# Patient Record
Sex: MALE | Race: BLACK OR AFRICAN AMERICAN | RURAL
[De-identification: names, ages, dates, MRNs, and addresses within clinical notes are randomized per-mention and may not be internally consistent; named-entity substitution may affect disease eponyms.]

---

## 2018-05-17 ENCOUNTER — OFFICE VISIT (OUTPATIENT)
Dept: FAMILY MEDICINE CLINIC | Age: 28
End: 2018-05-17

## 2018-05-17 VITALS
OXYGEN SATURATION: 97 % | HEART RATE: 77 BPM | TEMPERATURE: 98.6 F | DIASTOLIC BLOOD PRESSURE: 65 MMHG | WEIGHT: 157.8 LBS | HEIGHT: 65 IN | RESPIRATION RATE: 20 BRPM | BODY MASS INDEX: 26.29 KG/M2 | SYSTOLIC BLOOD PRESSURE: 120 MMHG

## 2018-05-17 DIAGNOSIS — Q90.9 DOWN'S SYNDROME: ICD-10-CM

## 2018-05-17 DIAGNOSIS — Z00.00 HEALTHCARE MAINTENANCE: Primary | ICD-10-CM

## 2018-05-17 DIAGNOSIS — Z00.00 PREVENTATIVE HEALTH CARE: ICD-10-CM

## 2018-05-17 DIAGNOSIS — Z76.89 ENCOUNTER TO ESTABLISH CARE: ICD-10-CM

## 2018-05-17 NOTE — MR AVS SNAPSHOT
[address] [address] 
[phone] Patient: Zen Montana MRN: [id number] QSX: Visit Information Date & Time Provider Department Dept. Phone Encounter #  
 2018  2:40 PM Fabio Hemphill  [facility] [phone] [id number] Follow-up Instructions Return if symptoms worsen or fail to improve. Upcoming Health Maintenance Date Due DTaP/Tdap/Td series (1 - Tdap) 2009 Influenza Age 5 to Adult 2018 Allergies as of 2018  Review Complete On: 2018 By: Fabio Hemphill NP No Known Allergies Current Immunizations  Never Reviewed No immunizations on file. Not reviewed this visit You Were Diagnosed With   
  
 Codes Comments Healthcare maintenance    -  Primary ICD-10-CM: Z00.00 ICD-9-CM: V70.0 Encounter to establish care     ICD-10-CM: Z76.89 
ICD-9-CM: V65.8 Down's syndrome     ICD-10-CM: Q90.9 ICD-9-CM: 824. 0 Vitals BP Pulse Temp Resp Height(growth percentile) Weight(growth percentile) 120/65 77 98.6 °F (37 °C) (Oral) 20 5' 4.57\" (1.64 m) 157 lb 12.8 oz (71.6 kg) SpO2 BMI Smoking Status 97% 26.61 kg/m2 Never Smoker Vitals History BMI and BSA Data Body Mass Index Body Surface Area  
 26.61 kg/m 2 1.81 m 2 Your Updated Medication List  
  
   
This list is accurate as of 18  3:30 PM.  Always use your most recent med list.  
  
  
  
  
 diph,Pertuss(Acell),Tet Vac-PF 2 Lf-(2.5-5-3-5 mcg)-5Lf/0.5 mL susp Commonly known as:  ADACEL  
0.5 mL by IntraMUSCular route once for 1 dose. Indications: DIPHTHERIA-PERTUSSIS-TETANUS COMBINED PREVENTION Prescriptions Printed Refills diph,Pertuss,Acell,,Tet Vac-PF (ADACEL) 2 Lf-(2.5-5-3-5 mcg)-5Lf/0.5 mL susp 0 Si.5 mL by IntraMUSCular route once for 1 dose.  Indications: DIPHTHERIA-PERTUSSIS-TETANUS COMBINED PREVENTION Class: Print Route: IntraMUSCular We Performed the Following CBC W/O DIFF [70797 CPT(R)] LIPID PANEL [07100 CPT(R)] METABOLIC PANEL, COMPREHENSIVE [49717 CPT(R)] [address] Comments:  
 PT/OT eval for home modifications. Please refer to [facility]. Follow-up Instructions Return if symptoms worsen or fail to improve. Referral Information Referral ID Referred By Referred To  
  
 [id number] Jun Reid Not Available Visits Status Start Date End Date 1 New Request 5/17/18 5/17/19 If your referral has a status of pending review or denied, additional information will be sent to support the outcome of this decision. Patient Instructions Learning About Sleeping Well What does sleeping well mean? Sleeping well means getting enough sleep. How much sleep is enough varies among people. The number of hours you sleep is not as important as how you feel when you wake up. If you do not feel refreshed, you probably need more sleep. Another sign of not getting enough sleep is feeling tired during the day. The average total nightly sleep time is 7½ to 8 hours. Healthy adults may need a little more or a little less than this. Why is getting enough sleep important? Getting enough quality sleep is a basic part of good health. When your sleep suffers, your mood and your thoughts can suffer too. You may find yourself feeling more grumpy or stressed. Not getting enough sleep also can lead to serious problems, including injury, accidents, anxiety, and depression. What might cause poor sleeping? Many things can cause sleep problems, including: · Stress. Stress can be caused by fear about a single event, such as giving a speech. Or you may have ongoing stress, such as worry about work or school. · Depression, anxiety, and other mental or emotional conditions. · Changes in your sleep habits or surroundings. This includes changes that happen where you sleep, such as noise, light, or sleeping in a different bed. It also includes changes in your sleep pattern, such as having jet lag or working a late shift. · Health problems, such as pain, breathing problems, and restless legs syndrome. · Lack of regular exercise. How can you help yourself? Here are some tips that may help you sleep more soundly and wake up feeling more refreshed. Your sleeping area · Use your bedroom only for sleeping and sex. A bit of light reading may help you fall asleep. But if it doesn't, do your reading elsewhere in the house. Don't watch TV in bed. · Be sure your bed is big enough to stretch out comfortably, especially if you have a sleep partner. · Keep your bedroom quiet, dark, and cool. Use curtains, blinds, or a sleep mask to block out light. To block out noise, use earplugs, soothing music, or a \"white noise\" machine. Your evening and bedtime routine · Create a relaxing bedtime routine. You might want to take a warm shower or bath, listen to soothing music, or drink a cup of noncaffeinated tea. · Go to bed at the same time every night. And get up at the same time every morning, even if you feel tired. What to avoid · Limit caffeine (coffee, tea, caffeinated sodas) during the day, and don't have any for at least 4 to 6 hours before bedtime. · Don't drink alcohol before bedtime. Alcohol can cause you to wake up more often during the night. · Don't smoke or use tobacco, especially in the evening. Nicotine can keep you awake. · Don't take naps during the day, especially close to bedtime. · Don't lie in bed awake for too long. If you can't fall asleep, or if you wake up in the middle of the night and can't get back to sleep within 15 minutes or so, get out of bed and go to another room until you feel sleepy.  
· Don't take medicine right before bed that may keep you awake or make you feel hyper or energized. Your doctor can tell you if your medicine may do this and if you can take it earlier in the day. If you can't sleep · Imagine yourself in a peaceful, pleasant scene. Focus on the details and feelings of being in a place that is relaxing. · Get up and do a quiet or boring activity until you feel sleepy. · Don't drink any liquids after 6 p.m. if you wake up often because you have to go to the bathroom. Where can you learn more? Go to http://jefferson-kizzy.info/. Enter C653 in the search box to learn more about \"Learning About Sleeping Well. \" Current as of: May 12, 2017 Content Version: 11.4 © 4391-1147 Healthwise, [employer]. Care instructions adapted under license by [employer] (which disclaims liability or warranty for this information). If you have questions about a medical condition or this instruction, always ask your healthcare professional. [place] [address] any warranty or liability for your use of this information. Introducing [facility]! Avni Peters introduces [employer] patient portal. Now you can access parts of your medical record, email your doctor's office, and request medication refills online. 1. In your internet browser, go to [email or web address] 2. Click on the First Time User? Click Here link in the Sign In box. You will see the New Member Sign Up page. 3. Enter your [employer] Access Code exactly as it appears below. You will not need to use this code after youve completed the sign-up process. If you do not sign up before the expiration date, you must request a new code. · [employer] Access Code: [email or web address] Expires: 8/15/2018  2:41 PM 
 
4. Enter the last four digits of your Social Security Number (xxxx) and Date of Birth (mm/dd/yyyy) as indicated and click Submit. You will be taken to the next sign-up page. 5. Create a [employer] ID. This will be your [employer] login ID and cannot be changed, so think of one that is secure and easy to remember. 6. Create a [employer] password. You can change your password at any time. 7. Enter your Password Reset Question and Answer. This can be used at a later time if you forget your password. 8. Enter your e-mail address. You will receive e-mail notification when new information is available in [address] 9. Click Sign Up. You can now view and download portions of your medical record. 10. Click the Download Summary menu link to download a portable copy of your medical information. If you have questions, please visit the Frequently Asked Questions section of the [employer] website. Remember, [employer] Network is NOT to be used for urgent needs. For medical emergencies, dial 911. Now available from your iPhone and Android! Please provide this summary of care documentation to your next provider. If you have any questions after today's visit, please call [phone].

## 2018-05-17 NOTE — PROGRESS NOTES
1. Have you been to the ER, urgent care clinic since your last visit? Hospitalized since your last visit? No    2. Have you seen or consulted any other health care providers outside of the [facility] since your last visit? Include any pap smears or colon screening.  No  Reviewed record in preparation for visit and have necessary documentation  Pt did not bring medication to office visit for review    Goals that were addressed and/or need to be completed during or after this appointment include     Health Maintenance Due   Topic Date Due    DTaP/Tdap/Td series (1 - Tdap) 12/25/2009

## 2018-05-17 NOTE — PROGRESS NOTES
Alexandra Bonilla  32 y.o. male  1990  [address] [facility]  <[id number]>     [facility]: Progress Note       Encounter Date: 5/17/2018    Chief Complaint   Patient presents with    New Patient     History of Present Illness   Alexandra Bonilla is a 32 y.o. male who presents to clinic today with his mother and Cholo Lopez RN Case Manager ([phone] cell) for: Establishing care and insurance recommendations. Per RN Case Manager, Naomi Gordon qualifies for home modifications and equipment. Per mom, Naomi Gordon has fears about falling and refuses to get in the bathtub/shower. RN Case Manager request a referral for HH-PT/OT evaluation and recommendations for DME and any other home safety recommendations. RN Case Manager states that Naomi Gordon also qualifies for dental treatment beyond basic preventative care. A prior authorization is required and documentation from the dentist and PCP is needed. Naomi Gordon has several dental caries. At this time his oral health does not hinder him from eating however due to his intake of sugary drinks he is at risk for pulpitis, gingivitis, possible abscesses and cardiac disease. Currently Naomi Gordon has no CC. He eats a balanced diet however he does add salt to his foods. Discussed decreasing sugary drinks (Mt. Dew) and also not drinking caffenine after 1800. Patient is awake sometimes to 0400 and admits to drinking soda all day and night. Naomi Gordon denies chest pain, SOB, dizziness/fainting, decreased appetite, hot/cold intolerance, issues with voiding/stooling. Health Maintenance  Prescription printed for the Tdap and patient will take it to his local pharmacy for administration. Health Maintenance Due   Topic Date Due    DTaP/Tdap/Td series (1 - Tdap) 12/25/2009     Review of Systems   Review of Systems   Constitutional: Negative. HENT: Negative. Eyes: Negative. Respiratory: Positive for cough. Cardiovascular: Negative. Gastrointestinal: Negative. Genitourinary: Negative. Musculoskeletal: Negative. Skin: Negative. Neurological: Negative. Endo/Heme/Allergies: Positive for environmental allergies. Psychiatric/Behavioral: Negative. Vitals/Objective:     Vitals:    05/17/18 1444   BP: 120/65   Pulse: 77   Resp: 20   Temp: 98.6 °F (37 °C)   TempSrc: Oral   SpO2: 97%   Weight: 157 lb 12.8 oz (71.6 kg)   Height: 5' 4.57\" (1.64 m)     Body mass index is 26.61 kg/(m^2). Physical Exam   Constitutional: He is oriented to person, place, and time. Vital signs are normal. He appears well-developed and well-nourished. He is active and cooperative. HENT:   Head: Normocephalic. Right Ear: Hearing, tympanic membrane, external ear and ear canal normal.   Left Ear: Hearing, tympanic membrane, external ear and ear canal normal.   Nose: Nose normal.   Mouth/Throat: Uvula is midline and oropharynx is clear and moist. Mucous membranes are dry. Dental caries present. Eyes: Conjunctivae and lids are normal. Pupils are equal, round, and reactive to light. Neck: Trachea normal, normal range of motion, full passive range of motion without pain and phonation normal. Neck supple. No thyromegaly present. Cardiovascular: Normal rate, regular rhythm, normal heart sounds and normal pulses. Pulmonary/Chest: Effort normal and breath sounds normal.   Abdominal: Soft. Bowel sounds are normal. There is no hepatosplenomegaly. There is no tenderness. There is no CVA tenderness. Musculoskeletal: Normal range of motion. Lymphadenopathy:     He has no cervical adenopathy. Neurological: He is alert and oriented to person, place, and time. He has normal strength. He displays a negative Romberg sign. CN III-XII intact   Skin: Skin is warm, dry and intact. Psychiatric: He has a normal mood and affect. His behavior is normal. Judgment and thought content normal. His speech is delayed.  Cognition and memory are normal.   Patient with Down Syndrome. No results found for this or any previous visit (from the past 24 hour(s)). Assessment and Plan:   1. Healthcare maintenance    - CBC W/O DIFF  - LIPID PANEL  - METABOLIC PANEL, COMPREHENSIVE    2. Encounter to establish care  - CBC W/O DIFF  - LIPID PANEL  - METABOLIC PANEL, COMPREHENSIVE    3. Down's syndrome    - REFERRAL TO HOME HEALTH  - CBC W/O DIFF  - LIPID PANEL  - METABOLIC PANEL, COMPREHENSIVE    Spoke with Emilee Keller with [employer] for home health benefits. Awaiting fasting labs. [facility]waiting records from [facility]. Also awaiting the name of the dentist and records in order to assist with prior authorization for dental treatment. I have discussed the diagnosis with the patient and the intended plan as seen in the above orders. he has expressed understanding. The patient has received an after-visit summary and questions were answered concerning future plans. I have discussed medication side effects and warnings with the patient as well. Electronically Signed: Elie Lim NP     History/Allergies   Patients past medical, surgical and family histories were reviewed and updated. Past Medical History:   Diagnosis Date    Down syndrome     History reviewed. No pertinent surgical history. Family History   Problem Relation Age of Onset    Diabetes Mother     Coronary Artery Disease Maternal Grandmother       of an MI at 54years old.  Kidney Disease Maternal Grandmother      Dialysis      Social History     Social History    Marital status: SINGLE     Spouse name: N/A    Number of children: N/A    Years of education: N/A     Occupational History    Not on file.      Social History Main Topics    Smoking status: Never Smoker    Smokeless tobacco: Never Used    Alcohol use 0.6 oz/week     1 Cans of beer per week      Comment: socially    Drug use: No    Sexual activity: No     Other Topics Concern    Not on file Social History Narrative    No narrative on file         No Known Allergies    Disposition     Follow-up Disposition:  Return if symptoms worsen or fail to improve. No future appointments. Current Medications after this visit     Current Outpatient Prescriptions   Medication Sig    diph,Pertuss,Acell,,Tet Vac-PF (ADACEL) 2 Lf-(2.5-5-3-5 mcg)-5Lf/0.5 mL susp 0.5 mL by IntraMUSCular route once for 1 dose. Indications: DIPHTHERIA-PERTUSSIS-TETANUS COMBINED PREVENTION     No current facility-administered medications for this visit. There are no discontinued medications.

## 2018-07-25 ENCOUNTER — TELEPHONE (OUTPATIENT)
Dept: FAMILY MEDICINE CLINIC | Age: 28
End: 2018-07-25

## 2018-07-25 NOTE — TELEPHONE ENCOUNTER
Brittneyrinorberto [facility] nurse with [employer] called stating that pt is approved for Home modifications, but needs an home modification order for rails on front and rear step, convert tub into walk in shower and install rails for high rise toilet.  Order can be faxed to [phone]  Phone number# [phone]

## 2018-07-26 DIAGNOSIS — Q90.9 DOWN SYNDROME: Primary | ICD-10-CM

## 2020-05-01 ENCOUNTER — TELEPHONE (OUTPATIENT)
Dept: FAMILY MEDICINE CLINIC | Age: 30
End: 2020-05-01

## 2020-05-06 NOTE — TELEPHONE ENCOUNTER
Khadijah with [facility] is requesting a call back regarding a fax sent on 4/20/20 regarding patients incontinence supplies

## 2022-03-20 PROBLEM — Q90.9 DOWN'S SYNDROME: Status: ACTIVE | Noted: 2018-05-17

## 2023-01-06 ENCOUNTER — OFFICE VISIT (OUTPATIENT)
Dept: FAMILY MEDICINE CLINIC | Age: 33
End: 2023-01-06

## 2023-01-06 VITALS
HEART RATE: 99 BPM | WEIGHT: 167 LBS | RESPIRATION RATE: 18 BRPM | DIASTOLIC BLOOD PRESSURE: 48 MMHG | BODY MASS INDEX: 27.82 KG/M2 | SYSTOLIC BLOOD PRESSURE: 111 MMHG | TEMPERATURE: 98 F | HEIGHT: 65 IN | OXYGEN SATURATION: 99 %

## 2023-01-06 DIAGNOSIS — Q90.9 DOWN SYNDROME: ICD-10-CM

## 2023-01-06 DIAGNOSIS — Z00.00 ENCOUNTER FOR MEDICAL EXAMINATION TO ESTABLISH CARE: ICD-10-CM

## 2023-01-06 DIAGNOSIS — Z00.00 VISIT FOR WELL MAN HEALTH CHECK: Primary | ICD-10-CM

## 2023-01-06 NOTE — PROGRESS NOTES
[facility]    History of Present Illness:   Kaleta Osgood is a 27 y.o. male with history of Down Syndrome  CC: Reestablish Care, Down Syndrome  History provided by patient and Records    HPI:  Well Male exam:  Kaleta Osgood is a 27 y.o. Male presenting for well male exam.     How would you rate your health in generally over the last year? Good  Has your physical and emotional health limited your social activities with family or friends? no    Current Complaints? Patient with Down Syndrome. Patient does have some issues with having specific foods he will and will not drink or eat. Patient though is generally happy, no issues with violent outbursts, sleeps normally. Noting does have issues with anxiety though. Denies breathing issues. Does exercise daily. Patient is very active overall. Patient can speak and answer simple questions, but is not able to make complicated medical decisions, parents have Power of  over him. Pertinent past medical history: Denies smoking. Sexual History:  Sexually active: No    Urination: Normal urination    Diet/Exercise History:  Diet: Favorite food Chicken. - Does patient eat at least 5 servings of Fruits/vegetables daily? No   - Is patient currently dieting? No    Exercise: Patient does have structured exercise  - Does patient get 150 minutes of structured exercise weekly? No  - Type of work patient has? [employer]  - Limitations to exercise? None    Healthcare maintenance:   Health Maintenance Due   Topic Date Due    Hepatitis C Screening  Never done    Depression Screen  Never done    COVID-19 Vaccine (1) Never done    DTaP/Tdap/Td series (1 - Tdap) Never done    Flu Vaccine (1) Never done     Colonoscopy indicated? No   DEXA scan indicated? No   HIV/STI testing indicated? No   Hepatitis C testing indicated? No   Lung cancer screening indicated? No   AAA screening indicated?   No       There is no immunization history on file for this patient. Flu indicated? Yes  Tdap indicated? Yes  Pneumovax indicated? No   Zostervax indicated? No   Meningococcal indicated? No      Health Maintenance  Health Maintenance Due   Topic Date Due    Hepatitis C Screening  Never done    Depression Screen  Never done    COVID-19 Vaccine (1) Never done    DTaP/Tdap/Td series (1 - Tdap) Never done    Flu Vaccine (1) Never done       Past Medical, Family, and Social History:     Current Outpatient Medications on File Prior to Visit   Medication Sig Dispense Refill    OTHER Home modifications-Order for rails on front and rear step. Convert tub into walk in shower and install rails for high rise toilet. 1 Each 0     No current facility-administered medications on file prior to visit. Patient Active Problem List   Diagnosis Code    Down's syndrome Q90.9       Social History     Socioeconomic History    Marital status: SINGLE   Tobacco Use    Smoking status: Never    Smokeless tobacco: Never   Substance and Sexual Activity    Alcohol use: Yes     Alcohol/week: 1.0 standard drink     Types: 1 Cans of beer per week     Comment: socially    Drug use: No    Sexual activity: Never     Social Determinants of Health     Financial Resource Strain: Low Risk     Difficulty of Paying Living Expenses: Not hard at all   Food Insecurity: No Food Insecurity    Worried About Running Out of Food in the Last Year: Never true    Ran Out of Food in the Last Year: Never true        Review of Systems   Review of Systems   Constitutional:  Negative for chills and fever. Cardiovascular:  Negative for chest pain, palpitations and orthopnea. Gastrointestinal:  Negative for abdominal pain, nausea and vomiting. Neurological:  Negative for dizziness, tingling and headaches.      Objective:   Visit Vitals  BP (!) 111/48 (BP 1 Location: Right upper arm, BP Patient Position: Sitting, BP Cuff Size: Adult)   Pulse 99   Temp 98 °F (36.7 °C) (Oral)   Resp 18   Ht 5' 4.7\" (1.643 m)   Wt 167 lb (75.8 kg)   SpO2 99%   BMI 28.05 kg/m²        Physical Exam  Vitals and nursing note reviewed. Constitutional:       Appearance: Normal appearance. HENT:      Head: Normocephalic and atraumatic. Cardiovascular:      Rate and Rhythm: Normal rate and regular rhythm. Pulses: Normal pulses. Heart sounds: Normal heart sounds. No murmur heard. No friction rub. No gallop. Pulmonary:      Effort: Pulmonary effort is normal.      Breath sounds: Normal breath sounds. Abdominal:      General: Abdomen is flat. Bowel sounds are normal.      Palpations: Abdomen is soft. Musculoskeletal:      Cervical back: Normal range of motion and neck supple. Skin:     General: Skin is warm and dry. Neurological:      Mental Status: He is alert. Pertinent Labs/Studies:      Assessment and orders:       ICD-10-CM ICD-9-CM    1. Visit for [facility] health check  Z00.00 V70.0       2. Down syndrome  Q90.9 758.0       3. Encounter for medical examination to establish care  Z00.00 V70.9         Diagnoses and all orders for this visit:    1. Visit for [facility] health check: Patient is overall well (see attached note for pertinent problem visit if needed). After review of medical history and current health patient was counseled on Exercise and given age appropriate information in his AVS.  Appropriate vaccinations, labs, imaging, and referrals were ordered as listed below. Medications were ordered as need for management of stable chronic conditions. We discussed his BMI with plan:  Diet . We discussed use of Alcohol, Tobacco, and illicit drugs and appropriate counseling was given as needed. 2. Down syndrome    3. Encounter for medical examination to establish care    Follow-up and Dispositions    Return in about 1 year (around 1/6/2024). I have discussed the diagnosis with the patient and the intended plan as seen in the above orders. Social history, medical history, and labs were reviewed.   The patient has received an after-visit summary and questions were answered concerning future plans. I have discussed medication side effects and warnings with the patient as well.     MD HANNAH Quijano & JIGNA LOWRY [facility]  01/06/23

## 2023-01-06 NOTE — PROGRESS NOTES
1. \"Have you been to the ER, urgent care clinic since your last visit? Hospitalized since your last visit? \" No    2. \"Have you seen or consulted any other health care providers outside of the [address] [place] [address] since your last visit? \" No     3. For patients aged 39-70: Has the patient had a colonoscopy / FIT/ Cologuard? NA - based on age      If the patient is female:    4. For patients aged 41-77: Has the patient had a mammogram within the past 2 years? NA - based on age or sex      11. For patients aged 21-65: Has the patient had a pap smear?  NA - based on age or sex    Health Maintenance Due   Topic Date Due    Hepatitis C Screening  Never done    Depression Screen  Never done    COVID-19 Vaccine (1) Never done    DTaP/Tdap/Td series (1 - Tdap) Never done    Flu Vaccine (1) Never done

## 2023-09-28 ENCOUNTER — OFFICE VISIT (OUTPATIENT)
Facility: CLINIC | Age: 33
End: 2023-09-28
Payer: MEDICAID

## 2023-09-28 VITALS
HEART RATE: 78 BPM | DIASTOLIC BLOOD PRESSURE: 66 MMHG | RESPIRATION RATE: 20 BRPM | HEIGHT: 64 IN | OXYGEN SATURATION: 97 % | WEIGHT: 167 LBS | SYSTOLIC BLOOD PRESSURE: 88 MMHG | BODY MASS INDEX: 28.51 KG/M2 | TEMPERATURE: 97.8 F

## 2023-09-28 DIAGNOSIS — Z78.9 IMPAIRED MOBILITY AND ADLS: ICD-10-CM

## 2023-09-28 DIAGNOSIS — Q90.9 DOWN'S SYNDROME: Primary | ICD-10-CM

## 2023-09-28 DIAGNOSIS — Z74.09 IMPAIRED MOBILITY AND ADLS: ICD-10-CM

## 2023-09-28 PROCEDURE — 99212 OFFICE O/P EST SF 10 MIN: CPT | Performed by: FAMILY MEDICINE

## 2023-09-28 SDOH — ECONOMIC STABILITY: INCOME INSECURITY: HOW HARD IS IT FOR YOU TO PAY FOR THE VERY BASICS LIKE FOOD, HOUSING, MEDICAL CARE, AND HEATING?: NOT HARD AT ALL

## 2023-09-28 SDOH — ECONOMIC STABILITY: HOUSING INSECURITY
IN THE LAST 12 MONTHS, WAS THERE A TIME WHEN YOU DID NOT HAVE A STEADY PLACE TO SLEEP OR SLEPT IN A SHELTER (INCLUDING NOW)?: NO

## 2023-09-28 SDOH — ECONOMIC STABILITY: FOOD INSECURITY: WITHIN THE PAST 12 MONTHS, YOU WORRIED THAT YOUR FOOD WOULD RUN OUT BEFORE YOU GOT MONEY TO BUY MORE.: NEVER TRUE

## 2023-09-28 SDOH — ECONOMIC STABILITY: FOOD INSECURITY: WITHIN THE PAST 12 MONTHS, THE FOOD YOU BOUGHT JUST DIDN'T LAST AND YOU DIDN'T HAVE MONEY TO GET MORE.: NEVER TRUE

## 2023-09-28 ASSESSMENT — ENCOUNTER SYMPTOMS
CHEST TIGHTNESS: 0
BACK PAIN: 0

## 2023-09-28 NOTE — PROGRESS NOTES
[facility]    History of Present Illness:   Audrey Moody is a 27 y.o. male with history of Down Syndrome  CC: Down Syndrome  History provided by father of patient and Records    HPI:  Down Syndrome:  Patient is overall doing well at this time and is staying healthy. Of not though with his Down Syndrome he has been having difficulty negotiating use of standard shower. This has been limiting his ability to take care of personal hygiene, and they are hoping to get converted to a walk in shower. Patient with Down 's syndrome which impairs balance and makes getting in and out of standard tub a fall risk. Patient is very concerned about this and avoids tub as he is concerned he will fall. Health Maintenance  Health Maintenance Due   Topic Date Due    COVID-19 Vaccine (1) Never done    Hepatitis C screen  Never done    DTaP/Tdap/Td vaccine (1 - Tdap) Never done    Flu vaccine (1) Never done       Past Medical, Family, and Social History:     No current outpatient medications on file prior to visit. No current facility-administered medications on file prior to visit. Patient Active Problem List   Diagnosis    Down's syndrome       Social History     Socioeconomic History    Marital status: Single     Spouse name: None    Number of children: None    Years of education: None    Highest education level: None   Tobacco Use    Smoking status: Never    Smokeless tobacco: Never   Substance and Sexual Activity    Alcohol use:  Yes     Alcohol/week: 1.0 standard drink of alcohol    Drug use: No     Social Determinants of Health     Financial Resource Strain: Low Risk  (9/28/2023)    Overall Financial Resource Strain (CARDIA)     Difficulty of Paying Living Expenses: Not hard at all   Food Insecurity: No Food Insecurity (9/28/2023)    Hunger Vital Sign     Worried About Running Out of Food in the Last Year: Never true     Ran Out of Food in the Last Year: Never true   Transportation Needs:

## 2024-11-27 ENCOUNTER — TELEMEDICINE (OUTPATIENT)
Facility: CLINIC | Age: 34
End: 2024-11-27
Payer: MEDICAID

## 2024-11-27 DIAGNOSIS — Z83.3 FAMILY HISTORY OF DIABETES MELLITUS: ICD-10-CM

## 2024-11-27 DIAGNOSIS — R53.81 DEBILITY: ICD-10-CM

## 2024-11-27 DIAGNOSIS — Q90.9 DOWN'S SYNDROME: Primary | ICD-10-CM

## 2024-11-27 DIAGNOSIS — R29.6 FALLS FREQUENTLY: ICD-10-CM

## 2024-11-27 PROCEDURE — 99214 OFFICE O/P EST MOD 30 MIN: CPT | Performed by: FAMILY MEDICINE

## 2024-11-27 SDOH — ECONOMIC STABILITY: FOOD INSECURITY: WITHIN THE PAST 12 MONTHS, THE FOOD YOU BOUGHT JUST DIDN'T LAST AND YOU DIDN'T HAVE MONEY TO GET MORE.: NEVER TRUE

## 2024-11-27 SDOH — ECONOMIC STABILITY: FOOD INSECURITY: WITHIN THE PAST 12 MONTHS, YOU WORRIED THAT YOUR FOOD WOULD RUN OUT BEFORE YOU GOT MONEY TO BUY MORE.: NEVER TRUE

## 2024-11-27 SDOH — ECONOMIC STABILITY: TRANSPORTATION INSECURITY
IN THE PAST 12 MONTHS, HAS LACK OF TRANSPORTATION KEPT YOU FROM MEETINGS, WORK, OR FROM GETTING THINGS NEEDED FOR DAILY LIVING?: NO

## 2024-11-27 SDOH — ECONOMIC STABILITY: INCOME INSECURITY: HOW HARD IS IT FOR YOU TO PAY FOR THE VERY BASICS LIKE FOOD, HOUSING, MEDICAL CARE, AND HEATING?: NOT HARD AT ALL

## 2024-11-27 ASSESSMENT — PATIENT HEALTH QUESTIONNAIRE - PHQ9
6. FEELING BAD ABOUT YOURSELF - OR THAT YOU ARE A FAILURE OR HAVE LET YOURSELF OR YOUR FAMILY DOWN: NOT AT ALL
SUM OF ALL RESPONSES TO PHQ QUESTIONS 1-9: 0
4. FEELING TIRED OR HAVING LITTLE ENERGY: NOT AT ALL
8. MOVING OR SPEAKING SO SLOWLY THAT OTHER PEOPLE COULD HAVE NOTICED. OR THE OPPOSITE, BEING SO FIGETY OR RESTLESS THAT YOU HAVE BEEN MOVING AROUND A LOT MORE THAN USUAL: NOT AT ALL
7. TROUBLE CONCENTRATING ON THINGS, SUCH AS READING THE NEWSPAPER OR WATCHING TELEVISION: NOT AT ALL
1. LITTLE INTEREST OR PLEASURE IN DOING THINGS: NOT AT ALL
5. POOR APPETITE OR OVEREATING: NOT AT ALL
10. IF YOU CHECKED OFF ANY PROBLEMS, HOW DIFFICULT HAVE THESE PROBLEMS MADE IT FOR YOU TO DO YOUR WORK, TAKE CARE OF THINGS AT HOME, OR GET ALONG WITH OTHER PEOPLE: NOT DIFFICULT AT ALL
SUM OF ALL RESPONSES TO PHQ9 QUESTIONS 1 & 2: 0
9. THOUGHTS THAT YOU WOULD BE BETTER OFF DEAD, OR OF HURTING YOURSELF: NOT AT ALL
3. TROUBLE FALLING OR STAYING ASLEEP: NOT AT ALL
2. FEELING DOWN, DEPRESSED OR HOPELESS: NOT AT ALL
SUM OF ALL RESPONSES TO PHQ QUESTIONS 1-9: 0

## 2024-11-27 ASSESSMENT — ENCOUNTER SYMPTOMS
ABDOMINAL DISTENTION: 0
ABDOMINAL PAIN: 0
APNEA: 0

## 2024-11-27 NOTE — PROGRESS NOTES
\"Have you been to the ER, urgent care clinic since your last visit?  Hospitalized since your last visit?\"    NO    “Have you seen or consulted any other health care providers outside of [facility] since your last visit?”    NO            Click Here for Release of Records Request     Health Maintenance Due   Topic Date Due    Hepatitis C screen  Never done    Hepatitis B vaccine (1 of 3 - 19+ 3-dose series) Never done    DTaP/Tdap/Td vaccine (1 - Tdap) Never done    Depression Screen  01/06/2024    Flu vaccine (1) Never done    COVID-19 Vaccine (1 - 2023-24 season) Never done

## 2024-11-27 NOTE — PROGRESS NOTES
Negrito Boyer is a 33 y.o. male evaluated via [facility] on 24.  Patient Identity confirmed by .    Negrito Boyer, was evaluated through a synchronous (real-time) audio-video encounter. The patient (or guardian if applicable) is aware that this is a billable service, which includes applicable co-pays. This Virtual Visit was conducted with patient's (and/or legal guardian's) consent. Patient identification was verified, and a caregiver was present when appropriate.   The patient was located at Home: [address] [address]  Provider was located at Facility (Appt Dept): [address] [place] [address]  Confirm you are appropriately licensed, registered, or certified to deliver care in the state where the patient is located as indicated above. If you are not or unsure, please re-schedule the visit: Yes, I confirm.      Total time spent for this encounter: Not billed by time    --Nico Magaña MD on 2024 at 1:36 PM    An electronic signature was used to authenticate this note.     Consent:  He and/or health care decision maker is aware that that he may receive a bill for this telephone service, depending on his insurance coverage, and has provided verbal consent to proceed: Yes    Physician Location: Office  Patient Location: Home  Nurse Assisting with Encounter: Rafaela Smalls LPN    Chief Complaint   Patient presents with    Follow-up Chronic Condition      Information gathered from patient and/or health care decision maker.    HPI:   Down Syndrome:  Patient is overall doing well at this time and is staying healthy.  Of not though with his Down Syndrome he has been having difficulty negotiating use of standard shower and has been having falls fairly regularly.  Patient has noted he has had fears of falling in particularly.  Patient is noting he does not feel dizzy, or passing out.  Seems he is concerned about falling and this is increasing his fall risk.